# Patient Record
Sex: FEMALE | Race: WHITE | NOT HISPANIC OR LATINO | URBAN - METROPOLITAN AREA
[De-identification: names, ages, dates, MRNs, and addresses within clinical notes are randomized per-mention and may not be internally consistent; named-entity substitution may affect disease eponyms.]

---

## 2017-07-08 ENCOUNTER — COMMUNICATION - HEALTHEAST (OUTPATIENT)
Dept: SCHEDULING | Facility: CLINIC | Age: 42
End: 2017-07-08

## 2019-05-28 ENCOUNTER — OFFICE VISIT - HEALTHEAST (OUTPATIENT)
Dept: FAMILY MEDICINE | Facility: CLINIC | Age: 44
End: 2019-05-28

## 2019-05-28 ENCOUNTER — COMMUNICATION - HEALTHEAST (OUTPATIENT)
Dept: FAMILY MEDICINE | Facility: CLINIC | Age: 44
End: 2019-05-28

## 2019-05-28 DIAGNOSIS — Z13.0 SCREENING FOR DEFICIENCY ANEMIA: ICD-10-CM

## 2019-05-28 DIAGNOSIS — L71.0 PERIORAL DERMATITIS: ICD-10-CM

## 2019-05-28 DIAGNOSIS — Z12.31 VISIT FOR SCREENING MAMMOGRAM: ICD-10-CM

## 2019-05-28 DIAGNOSIS — Z30.432 ENCOUNTER FOR IUD REMOVAL: ICD-10-CM

## 2019-05-28 DIAGNOSIS — Z83.719 FAMILY HISTORY OF COLONIC POLYPS: ICD-10-CM

## 2019-05-28 DIAGNOSIS — Z00.00 ROUTINE GENERAL MEDICAL EXAMINATION AT A HEALTH CARE FACILITY: ICD-10-CM

## 2019-05-28 DIAGNOSIS — Z13.1 SCREENING FOR DIABETES MELLITUS: ICD-10-CM

## 2019-05-28 DIAGNOSIS — Z13.220 SCREENING FOR CHOLESTEROL LEVEL: ICD-10-CM

## 2019-05-28 DIAGNOSIS — Z12.4 SCREENING FOR CERVICAL CANCER: ICD-10-CM

## 2019-05-28 LAB
CHOLEST SERPL-MCNC: 171 MG/DL
FASTING STATUS PATIENT QL REPORTED: YES
FASTING STATUS PATIENT QL REPORTED: YES
GLUCOSE BLD-MCNC: 93 MG/DL (ref 70–99)
HDLC SERPL-MCNC: 97 MG/DL
HGB BLD-MCNC: 12.1 G/DL (ref 12–16)
LDLC SERPL CALC-MCNC: 64 MG/DL
TRIGL SERPL-MCNC: 52 MG/DL

## 2019-05-28 ASSESSMENT — MIFFLIN-ST. JEOR: SCORE: 1382.71

## 2019-05-29 LAB
HPV SOURCE: NORMAL
HUMAN PAPILLOMA VIRUS 16 DNA: NEGATIVE
HUMAN PAPILLOMA VIRUS 18 DNA: NEGATIVE
HUMAN PAPILLOMA VIRUS FINAL DIAGNOSIS: NORMAL
HUMAN PAPILLOMA VIRUS OTHER HR: NEGATIVE
SPECIMEN DESCRIPTION: NORMAL

## 2019-06-03 LAB
BKR LAB AP ABNORMAL BLEEDING: NO
BKR LAB AP BIRTH CONTROL/HORMONES: NORMAL
BKR LAB AP CERVICAL APPEARANCE: NORMAL
BKR LAB AP GYN ADEQUACY: NORMAL
BKR LAB AP GYN INTERPRETATION: NORMAL
BKR LAB AP HPV REFLEX: NORMAL
BKR LAB AP LMP: NORMAL
BKR LAB AP PATIENT STATUS: NORMAL
BKR LAB AP PREVIOUS ABNORMAL: NORMAL
BKR LAB AP PREVIOUS NORMAL: NORMAL
HIGH RISK?: YES
PATH REPORT.COMMENTS IMP SPEC: NORMAL
RESULT FLAG (HE HISTORICAL CONVERSION): NORMAL

## 2019-06-18 ENCOUNTER — COMMUNICATION - HEALTHEAST (OUTPATIENT)
Dept: FAMILY MEDICINE | Facility: CLINIC | Age: 44
End: 2019-06-18

## 2019-06-19 ENCOUNTER — COMMUNICATION - HEALTHEAST (OUTPATIENT)
Dept: FAMILY MEDICINE | Facility: CLINIC | Age: 44
End: 2019-06-19

## 2019-10-31 ENCOUNTER — RECORDS - HEALTHEAST (OUTPATIENT)
Dept: ADMINISTRATIVE | Facility: OTHER | Age: 44
End: 2019-10-31

## 2021-05-29 NOTE — TELEPHONE ENCOUNTER
Fax received from [facility], they have started the Prior Authorization Process via Cover My Meds    CoverMyMeds Key: [id number]    Medication Name:   pimecrolimus (ELIDEL) 1 % cream 30 g 1 5/28/2019     Sig: Apply to affected area 2 times daily for one month          Insurance Plan:   PBM: [employer]  Patient ID: not provided on fax    Please complete the PA process

## 2021-05-29 NOTE — TELEPHONE ENCOUNTER
Central PA team  [phone]  Pool: HE PA MED ([id number])          PA has been initiated.       PA form completed and faxed insurance via Cover My Meds     Key:  [id number] - PA Case ID: [id number] - Rx #: [id number]     Medication:  Pimecrolimus 1% cream    Insurance:  [employer]        Response will be received via fax and may take up to 5-10 business days depending on plan

## 2021-05-29 NOTE — PROGRESS NOTES
FEMALE PREVENTIVE EXAM    Assessment & Plan   1. Routine general medical examination at a health care facility  Fasting labs and pap, if normal repeat in five years.     2. Encounter for IUD removal  IUD removed without difficulty.  Counseled on immediate return to fertility.  She is in a monogamous relationship and partner has had vasectomy, declines counseling for other options    3. Perioral dermatitis  Appearance around mouth and eyes consistent with perioral dermatitis. Discussed treatment options and will start with Elidel.  Encouraged her to let me know if this is too expensive. Treat twice daily for one month. If no improvement, consider erythromycin  - pimecrolimus (ELIDEL) 1 % cream; Apply to affected area 2 times daily for one month  Dispense: 30 g; Refill: 1    4. Family history of colonic polyps  Family history colon polyps and colon cancer in [facility].  Recommended early colonoscopy and discussed current guidelines.  Referral for [facility], her preference  - Ambulatory referral for Colonoscopy    5. Screening for cervical cancer  - Gynecologic Cytology (PAP Smear)    6. Visit for screening mammogram  - Mammo Screening Bilateral; Future    7. Screening for cholesterol level  - Lipid Cascade    8. Screening for diabetes mellitus  - Glucose    9. Screening for deficiency anemia  - Hemoglobin    Recommend repeat pap smear if normal every five years, Recommended adequate calcium intake/osteoporosis prevention, Discussed breast cancer screening guidelines, Discussed colon cancer screening at age 50, 45 if -American and Discussed diet, including moderation of portions sizes, avoiding eating out and fast food and increase in fruits and vegetables    Sharon Mckoy CNP    Subjective:   Chief Complaint:  Establish Care; Annual Exam (fasting - pap); and Rash (rash around eyes, has been there since January, sometimes itchy, very irritated and sore)    HPI:  Kala Mccollum is a 44 y.o. female who presents for  routine physical exam.  She is a previous patient of Dr. Ahuja.  PMH negative for chronic concern.     Rash:  Present for 4-5 months.  Perioral and periorbital. Eyelids not effected.  Erythematous papules.  Mildly pruritic.  Has used hydrocortisone 1% twice daily for up to a week.  Not necessarily noted any improvement or worsening with this.  No prior history of eczema or psoriasis.      IUD:  Paragard in placed since 2008. Monogamous relationship, partner has had vasectomy so not in need of additional contraceptive.  Desires removal.     [facility] colon polyps and colon cancer in [place].  Believes others in the family with polyps as well. [facility] of ovarian cancer postmenopausal in paternal aunt. No other breast or ovarian cancer.     OB/Gyn History:  Menstrual history: regular periods   Date of previous pap: 2015 without cotesting.   History of abnormal pap: none  Current Contraceptive method: Paragard IUD    Preventive Health:  Reviewed and recommended screening and treatment recommendations:  Mammography: due  Colonoscopy: due, [facility] colon polyps  Immunizations: up to date, states she has received two MMR vaccines.     Health Habits:    Exercise: yes, crossfit 3x/week.  Calcium intake/Osteoporosis prevention: yes    PMH:   Patient Active Problem List   Diagnosis     Abrasion Of The Right Cornea       No past medical history on file.    Current Medications: Reviewed   Current Outpatient Medications on File Prior to Visit   Medication Sig Dispense Refill     albuterol (PROVENTIL HFA;VENTOLIN HFA) 90 mcg/actuation inhaler Inhale 2 puffs every 6 (six) hours as needed for wheezing. 18 g 3     b complex vitamins tablet Take 1 tablet by mouth daily.       cholecalciferol, vitamin D3, 1,000 unit tablet Take 1,000 Units by mouth daily.       No current facility-administered medications on file prior to visit.        Allergies:  Reviewed  has No Known Allergies.    Social History:  Social History     Occupational History     Not  "on file   Tobacco Use     Smoking status: Never Smoker     Smokeless tobacco: Never Used   Substance and Sexual Activity     Alcohol use: Yes     Alcohol/week: 0.5 oz     Types: 1 drink(s) per week     Drug use: No     Sexual activity: Yes     Partners: Male     Comment: IUD at present - her partner has had a vasectomy       Family History:   Family History   Problem Relation Age of Onset     Alcohol abuse Maternal Uncle      Cancer Maternal Uncle         kidney     Cancer Paternal Aunt         ovarian     Colon cancer Maternal Grandmother      Alcohol abuse Maternal Grandfather      Asthma Paternal Grandmother          Review of Systems:  Complete head to toe review of systems is otherwise negative except as above.    Objective:    BP 94/52 (Patient Site: Right Arm, Patient Position: Sitting, Cuff Size: Adult Regular)   Pulse (!) 56   Ht 5' 8\" (1.727 m)   Wt 155 lb 4 oz (70.4 kg)   LMP 05/10/2019 (Approximate)   Breastfeeding? No   BMI 23.61 kg/m      GENERAL: Alert, well-appearing female .   PSYCH: Pleasant mood, affect appropriate.   memory. Good eye contact.  SKIN: No atypical lesions  EYES: Conjunctiva pink, sclera white, no exudates. BRENDEN.  EOMs intact.   EARS: TMs pearly grey, no bulging, redness, retraction.   MOUTH: Pharynx moist, pink without exudate. No tonsillar enlargement  NECK: No lymphadenopathy. Thyroid borders smooth without enlargement, nodules.   CV: Regular rate and rhythm without murmurs, rubs or gallops.  RESP: Lung sounds clear  ABDOMEN: BS+. Abdomen soft.  No organomegaly  BREASTS: Breasts symmetric, no dimpling, masses or skin discolorations seen. Areolas and nipples symmetric without discharge. On palpation, breast tissue supple and nontender. No masses or nodules. Axillary and epitrochlear lymph nodes nonpalpable.    FEMALE: External genitalia without lesions. Vaginal walls and cervix without lesions or masses. No abnormal discharge. Pap smear obtained. IUD strings visualized in " cervical os. Grasped with ring forceps and removed without difficulty. On bimanual palpation, uterus mobile, normal shape and contour. No adnexal masses or tenderness.   PV :  No edema

## 2021-05-29 NOTE — PATIENT INSTRUCTIONS - HE
Recommendations from today's visit                                                       1. We removed your IUD today. You can expect an immediate return to fertility and I would expect cycles to normalize within a month or two    2. For the eyes and mouth, the appearance of the rash is consistent with perioral dermatitis. We will treat this with a topical medication called Elidel twice daily for one month.  If effective, you can use for up to 6 weeks at a time.  The goal is to be able to eventually taper off and stop use.     3. I placed a referral for screening colonoscopy.  You will receive a call from [facility] to schedule this.      4.  I placed an order for screening mammogram.  Please use the card you received to schedule this.      Next appointment: one year, annual physical    To reschedule your appointment, please call the clinic directly at [phone].   It was a pleasure seeing you today! I look forward to seeing you again.

## 2021-06-03 VITALS — WEIGHT: 155.25 LBS | HEIGHT: 68 IN | BODY MASS INDEX: 23.53 KG/M2

## 2021-06-16 NOTE — TELEPHONE ENCOUNTER
Telephone Encounter by Cristy Castorena at 5/31/2019  1:36 PM     Author: Cristy Castorena Service: -- Author Type: --    Filed: 5/31/2019  1:39 PM Encounter Date: 5/28/2019 Status: Signed    : Cristy Castorena APPROVED:    Approval start date:05/01/2019  Approval end date:05/30/2020    Pharmacy has been notified of approval and will contact patient when medication is ready for pickup.

## 2021-06-19 NOTE — LETTER
Letter by Sharon Mckoy CNP at      Author: Sharon Mckoy CNP Service: -- Author Type: --    Filed:  Encounter Date: 6/19/2019 Status: (Other)           June 19, 2019        Kala Mccollum  [address] [place] [address]        Dear Kala,    Breast cancer is the most common type of cancer among American women. The earlier breast cancer is detected, the better the survival rate. Your best defense against breast cancer is having a screening mammogram on a regular basis. The American Cancer Society recommends that women with an average risk of breast cancer should undergo regular screening mammography starting at age 45 years.         Women aged 45 to 54 years should have a mammogram annually.     Women 55 years and older should have a mammogram at least every other year.     There may be important reasons for you to follow a more frequent screening plan or an earlier start for breast cancer screening, so please discuss your health history and your family health history with your primary care provider.       We reviewed our records and we do not see that you had a mammogram within the past two years. If you have not had a mammogram in the past two years, we strongly encourage you to call for your appointment today. For your convenience, we have included a phone number below for you to schedule your mammogram at a nearby [facility] location.      If you have had a recent mammogram or have questions about why you are receiving this letter, please contact your primary care clinic at [phone] or send a [employer] message  [email or web address]). Your clinic will answer any questions or help obtain recent mammogram reports for your chart at [facility] so we can keep record of your preventive care.       Sincerely,    Sharon Mckoy CNP    and your primary care team at [facility]  Schedule your mammogram today at one of our 7 locations  Please call  [phone]

## 2021-06-19 NOTE — LETTER
Letter by Sharon Mckyo CNP at      Author: Sharon Mckoy CNP Service: -- Author Type: --    Filed:  Encounter Date: 6/18/2019 Status: (Other)        [facility] FAMILY MEDICINE/OB  [address] [address]  [phone]         Kala Mccollum  [address] [place] [address]        06/18/19    Dear Kala Mccollum,     At [facility] we care about your health and well-being. Your primary care provider is committed to ensuring you receive high quality care and has chosen a network of specialists to assist in providing that care. Recently Sharon Mckoy referred you to Gastroenterology for specialty care.      It is important to your overall health to follow through with the recommendation from your provider. Please call [phone] at your earliest convenience for assistance in scheduling an appointment.  If you have already scheduled this appointment, please disregard this notice.  Thank you for choosing [facility] for your healthcare needs.       Sincerely,       [facility] Specialty Scheduling

## 2021-07-25 ENCOUNTER — HEALTH MAINTENANCE LETTER (OUTPATIENT)
Age: 46
End: 2021-07-25

## 2021-09-19 ENCOUNTER — HEALTH MAINTENANCE LETTER (OUTPATIENT)
Age: 46
End: 2021-09-19

## 2022-08-21 ENCOUNTER — HEALTH MAINTENANCE LETTER (OUTPATIENT)
Age: 47
End: 2022-08-21

## 2022-11-21 ENCOUNTER — HEALTH MAINTENANCE LETTER (OUTPATIENT)
Age: 47
End: 2022-11-21

## 2023-04-16 ENCOUNTER — HEALTH MAINTENANCE LETTER (OUTPATIENT)
Age: 48
End: 2023-04-16

## 2023-09-17 ENCOUNTER — HEALTH MAINTENANCE LETTER (OUTPATIENT)
Age: 48
End: 2023-09-17